# Patient Record
(demographics unavailable — no encounter records)

---

## 2025-02-14 NOTE — LETTER BODY
[FreeTextEntry1] : Joni Quinones MD  [address]  [address] 4, [address]  [place] [address]  [phone]    Dear Dr. Quinones,    Reason for Visit: BPH. Previous microscopic hematuria.    This is a 80 year-old gentleman with previous microscopic hematuria and symptoms of BPH. His hematuria evaluation in 2015 was unremarkable. His renal ultrasound in May 2022 was unremarkable. The patient returns today for follow up. Since he was last seen, the patient reports taking Flomax BID regularly without any side effects or difficulties with the medication. He reports progressive urinary symptoms despite medical therapy. He denies any gross hematuria or urinary incontinence. All other review of systems are negative. Past medical history, family history and social history were inquired and were noncontributory to current condition. Medications and allergies were reviewed. He has no known allergies to medication.    On examination, the patient is a healthy-appearing gentleman in no acute distress. He is alert and oriented follows commands. He has normal mood and affect. He is normocephalic. Neck is supple. Oral no thrush Respirations are unlabored. His abdomen is soft and nontender. Bladder is nonpalpable. No CVA tenderness. Neurologically he is grossly intact. No peripheral edema. Skin without gross abnormality.     His BMP in December 2024 demonstrated normal renal functions, creatinine 0.78. His PSA was 1.29, which is within normal limits. His urinalysis was unremarkable. His urine cytology was negative for high grade urothelial carcinoma.    ASSESSMENT: BPH. Previous microscopic hematuria.    I counseled the patient. In terms of his BPH, the patient reports progressive urinary symptoms despite being on Flomax BID. Patient has not met therapeutic treatment goals. I recommended he continues Flomax BID and add Proscar to the regiment. I discussed the potential side effects of the medication. I counseled the patient on its use and side effects. If the patient develops any side effects, the patient will discontinue medication and contact me. I renewed the patient's prescription for Proscar and Flomax BID today. I encouraged the patient to continue medications regularly as directed. He will repeat PSA and BMP today to ensure stability. In terms of his previous microscopic hematuria, his previous urine labs were negative. He will repeat urinalysis and urine cytology to look for high grade urothelial carcinoma. Risks and alternatives were discussed. I answered the patient questions. The patient will follow-up as directed and will contact me with any questions or concerns. Thank you for the opportunity to participate in the care of this patient, I will keep you updated on his progress.    Plan: Add Proscar. Continue Flomax BID. Urinalysis. Urine Cytology. PSA. BMP. Follow-up in 3 months.

## 2025-02-14 NOTE — LETTER BODY
[FreeTextEntry1] : Join Quinones MD  [address]  [address] 4, [place] [address]  [place] [address]  [phone]    Dear Dr. Quinones,    Reason for Visit: BPH. Previous microscopic hematuria.    This is a 80 year-old gentleman with previous microscopic hematuria and symptoms of BPH. His hematuria evaluation in 2015 was unremarkable. His renal ultrasound in May 2022 was unremarkable. The patient returns today for follow up. Since he was last seen, the patient reports taking Flomax BID regularly without any side effects or difficulties with the medication. He reports progressive urinary symptoms despite medical therapy. He denies any gross hematuria or urinary incontinence. All other review of systems are negative. Past medical history, family history and social history were inquired and were noncontributory to current condition. Medications and allergies were reviewed. He has no known allergies to medication.    On examination, the patient is a healthy-appearing gentleman in no acute distress. He is alert and oriented follows commands. He has normal mood and affect. He is normocephalic. Neck is supple. Oral no thrush Respirations are unlabored. His abdomen is soft and nontender. Bladder is nonpalpable. No CVA tenderness. Neurologically he is grossly intact. No peripheral edema. Skin without gross abnormality.     His BMP in December 2024 demonstrated normal renal functions, creatinine 0.78. His PSA was 1.29, which is within normal limits. His urinalysis was unremarkable. His urine cytology was negative for high grade urothelial carcinoma.    ASSESSMENT: BPH. Previous microscopic hematuria.    I counseled the patient. In terms of his BPH, the patient reports progressive urinary symptoms despite being on Flomax BID. Patient has not met therapeutic treatment goals. I recommended he continues Flomax BID and add Proscar to the regiment. I discussed the potential side effects of the medication. I counseled the patient on its use and side effects. If the patient develops any side effects, the patient will discontinue medication and contact me. I renewed the patient's prescription for Proscar and Flomax BID today. I encouraged the patient to continue medications regularly as directed. He will repeat PSA and BMP today to ensure stability. In terms of his previous microscopic hematuria, his previous urine labs were negative. He will repeat urinalysis and urine cytology to look for high grade urothelial carcinoma. Risks and alternatives were discussed. I answered the patient questions. The patient will follow-up as directed and will contact me with any questions or concerns. Thank you for the opportunity to participate in the care of this patient, I will keep you updated on his progress.    Plan: Add Proscar. Continue Flomax BID. Urinalysis. Urine Cytology. PSA. BMP. Follow-up in 3 months.

## 2025-02-14 NOTE — ADDENDUM
[FreeTextEntry1] : Entered by Juan Antonio Kilgore, acting as scribe for Dr. Antonio Fox. The documentation recorded by the scribe accurately reflects the service I personally performed and the decisions made by me.